# Patient Record
Sex: FEMALE | Race: BLACK OR AFRICAN AMERICAN | NOT HISPANIC OR LATINO
[De-identification: names, ages, dates, MRNs, and addresses within clinical notes are randomized per-mention and may not be internally consistent; named-entity substitution may affect disease eponyms.]

---

## 2020-08-26 ENCOUNTER — DASHBOARD ENCOUNTERS (OUTPATIENT)
Age: 20
End: 2020-08-26

## 2020-08-26 ENCOUNTER — OFFICE VISIT (OUTPATIENT)
Dept: URBAN - METROPOLITAN AREA CLINIC 40 | Facility: CLINIC | Age: 20
End: 2020-08-26

## 2020-08-26 ENCOUNTER — OFFICE VISIT (OUTPATIENT)
Dept: URBAN - METROPOLITAN AREA CLINIC 40 | Facility: CLINIC | Age: 20
End: 2020-08-26
Payer: MEDICAID

## 2020-08-26 DIAGNOSIS — Z87.898 HISTORY OF HEARTBURN: ICD-10-CM

## 2020-08-26 DIAGNOSIS — R10.30 LOWER ABDOMINAL PAIN: ICD-10-CM

## 2020-08-26 PROCEDURE — G8427 DOCREV CUR MEDS BY ELIG CLIN: HCPCS | Performed by: INTERNAL MEDICINE

## 2020-08-26 PROCEDURE — 99203 OFFICE O/P NEW LOW 30 MIN: CPT | Performed by: INTERNAL MEDICINE

## 2020-08-26 RX ORDER — PANTOPRAZOLE SODIUM 40 MG/1
1 TABLET TABLET, DELAYED RELEASE ORAL ONCE A DAY
Qty: 30 | Refills: 1 | OUTPATIENT
Start: 2020-08-26

## 2020-08-26 RX ORDER — DICYCLOMINE HYDROCHLORIDE 10 MG/1
1 TABLET CAPSULE ORAL BID PRN
Qty: 60 | Refills: 1 | OUTPATIENT
Start: 2020-08-26 | End: 2020-10-25

## 2020-08-26 NOTE — HPI-TODAY'S VISIT:
Ms. Ba is a pleasant 19-year-old male who returns to the office today with complaint of low abdominal pain.  She has a recent history of lower abdominal pain without any precipitating factors.  Admits that she missed her last menstrual period last month , but had a  normal menstrual period this month. No OB/GYN provider.  Denies any nausea or vomiting or dysphagia.  In the past, she has had GERD symptoms but not nearly as severe recently..  She has been ingesting a lot of dairy oftentimes eating cereal.  Some gas /bloat symptoms. She had an ER visit on August 15 for evaluation of lower abdominal pain.  She notes the pain begins in the last three years and is intermittent.  Denies any change in bowel habits but occasionally has loose stool.  She has been eating a bland dairy products.  Denies use of NSAIDs, alcohol. Labs in the ER recently were normal including CMP, CBC along as well as pregnancy testing.  She does not use tobacco but used to only occasionally smoking marijuana.  No prior GI evaluation or endoscopy.  No fever, chills, vomiting.  When she was seen in the ER for abdominal pain with also vomiting,which resolved spontaneously.  She was given a trial Zofran with discharge. We will order CT abdomen and pelvis with and without contrast.  She was also encouraged to establish care with a gynecologist.  If normal CT abdomen pelvis no improvement with antispasmodic therapy, dairy elimination with other dietary lifestyle modifications, will consider colonoscopy with ileoscopy at her return.

## 2020-08-27 ENCOUNTER — TELEPHONE ENCOUNTER (OUTPATIENT)
Dept: URBAN - METROPOLITAN AREA CLINIC 40 | Facility: CLINIC | Age: 20
End: 2020-08-27

## 2020-08-27 ENCOUNTER — OFFICE VISIT (OUTPATIENT)
Dept: URBAN - METROPOLITAN AREA CLINIC 40 | Facility: CLINIC | Age: 20
End: 2020-08-27

## 2020-09-01 ENCOUNTER — OFFICE VISIT (OUTPATIENT)
Dept: URBAN - METROPOLITAN AREA CLINIC 40 | Facility: CLINIC | Age: 20
End: 2020-09-01

## 2020-09-09 ENCOUNTER — TELEPHONE ENCOUNTER (OUTPATIENT)
Dept: URBAN - METROPOLITAN AREA CLINIC 92 | Facility: CLINIC | Age: 20
End: 2020-09-09

## 2020-09-15 ENCOUNTER — OFFICE VISIT (OUTPATIENT)
Dept: URBAN - METROPOLITAN AREA CLINIC 40 | Facility: CLINIC | Age: 20
End: 2020-09-15

## 2020-09-15 RX ORDER — PANTOPRAZOLE SODIUM 40 MG/1
1 TABLET TABLET, DELAYED RELEASE ORAL ONCE A DAY
Qty: 30 | Refills: 1 | OUTPATIENT

## 2020-09-15 RX ORDER — DICYCLOMINE HYDROCHLORIDE 10 MG/1
1 TABLET CAPSULE ORAL BID PRN
Qty: 60 | Refills: 1 | Status: ACTIVE | COMMUNITY
Start: 2020-08-26 | End: 2020-10-25

## 2020-09-15 RX ORDER — PANTOPRAZOLE SODIUM 40 MG/1
1 TABLET TABLET, DELAYED RELEASE ORAL ONCE A DAY
Qty: 30 | Refills: 1 | Status: ACTIVE | COMMUNITY
Start: 2020-08-26

## 2020-09-15 RX ORDER — DICYCLOMINE HYDROCHLORIDE 10 MG/1
1 TABLET CAPSULE ORAL BID PRN
Qty: 60 | Refills: 1 | OUTPATIENT